# Patient Record
(demographics unavailable — no encounter records)

---

## 2017-08-09 NOTE — REPUSA
HISTORY: Vaginal bleeding  LMP: 8/5/17

 

TECHNIQUE: .  Transabdominal and transvaginal pelvic ultrasound examination with color flow Doppler nicole velasquez.

 

FINDINGS: The uterus measures 6.5 x 3.6 x 5.0 cm and endometrial thickness measures 3.2 mm.  No uteri
ne masses seen.  There is a small amount of physiological fluid in the cul-de-sac.

 

Right ovary measures 3.9 x 2.0 x 2.7 cm with normal Doppler vascularity with resistive index of 0.36.
  There is a slightly hyperechoic rounded mass in the right ovary measuring 1.0 x 0.8 x 1.0 cm which 
could represent ovarian hemorrhagic cyst or possible mass.  Clinical correlation and follow-up imagin
g may be required as clinically indicated.

Left ovary measures 2.3 x 1.5 x 2.1 cm with normal follicular cysts, and with normal Doppler vascular
ity with resistive index of 0.63.

 

No other pathologic mass or abnormal fluid collection is seen in the pelvis.

 

IMPRESSION: 1.  No uterine pathologic mass is seen.

2.  Small round slightly hyperechoic nodule or hemorrhagic cyst in the right ovary as discussed above
.

3.  Left ovary is normal and no other pelvic mass lesions or abnormal fluid collections are seen.  Th
e small amount of fluid in the cul-de-sac is within normal limits for physiological fluid.

     Electronically signed by RADHA PRESLEY MD on 08/09/2017 08:48:55 PM ET